# Patient Record
Sex: MALE | Race: WHITE | NOT HISPANIC OR LATINO
[De-identification: names, ages, dates, MRNs, and addresses within clinical notes are randomized per-mention and may not be internally consistent; named-entity substitution may affect disease eponyms.]

---

## 2017-01-17 PROBLEM — Z00.00 ENCOUNTER FOR PREVENTIVE HEALTH EXAMINATION: Status: ACTIVE | Noted: 2017-01-17

## 2017-01-19 ENCOUNTER — APPOINTMENT (OUTPATIENT)
Dept: PULMONOLOGY | Facility: CLINIC | Age: 67
End: 2017-01-19

## 2017-01-19 VITALS
DIASTOLIC BLOOD PRESSURE: 80 MMHG | OXYGEN SATURATION: 97 % | BODY MASS INDEX: 28.06 KG/M2 | RESPIRATION RATE: 17 BRPM | WEIGHT: 196 LBS | HEIGHT: 70 IN | HEART RATE: 71 BPM | SYSTOLIC BLOOD PRESSURE: 140 MMHG

## 2017-01-19 DIAGNOSIS — Z87.898 PERSONAL HISTORY OF OTHER SPECIFIED CONDITIONS: ICD-10-CM

## 2017-01-19 DIAGNOSIS — Z82.69 FAMILY HISTORY OF OTHER DISEASES OF THE MUSCULOSKELETAL SYSTEM AND CONNECTIVE TISSUE: ICD-10-CM

## 2017-01-19 DIAGNOSIS — Z78.9 OTHER SPECIFIED HEALTH STATUS: ICD-10-CM

## 2017-01-19 DIAGNOSIS — Z80.42 FAMILY HISTORY OF MALIGNANT NEOPLASM OF PROSTATE: ICD-10-CM

## 2017-01-19 DIAGNOSIS — R07.89 OTHER CHEST PAIN: ICD-10-CM

## 2017-01-19 DIAGNOSIS — R00.2 PALPITATIONS: ICD-10-CM

## 2017-01-19 DIAGNOSIS — Z83.3 FAMILY HISTORY OF DIABETES MELLITUS: ICD-10-CM

## 2017-01-19 DIAGNOSIS — Z82.49 FAMILY HISTORY OF ISCHEMIC HEART DISEASE AND OTHER DISEASES OF THE CIRCULATORY SYSTEM: ICD-10-CM

## 2017-01-19 DIAGNOSIS — Z86.79 PERSONAL HISTORY OF OTHER DISEASES OF THE CIRCULATORY SYSTEM: ICD-10-CM

## 2017-01-19 RX ORDER — LEVALBUTEROL TARTRATE 45 UG/1
45 AEROSOL, METERED ORAL EVERY 6 HOURS
Qty: 1 | Refills: 1 | Status: ACTIVE | COMMUNITY
Start: 2017-01-19 | End: 1900-01-01

## 2017-02-28 PROBLEM — Z82.69 FAMILY HISTORY OF SYSTEMIC LUPUS ERYTHEMATOSUS: Status: ACTIVE | Noted: 2017-01-19

## 2017-02-28 PROBLEM — Z87.898 HISTORY OF MARIJUANA USE: Status: ACTIVE | Noted: 2017-01-19

## 2017-02-28 PROBLEM — R00.2 PALPITATIONS: Status: ACTIVE | Noted: 2017-01-19

## 2017-02-28 PROBLEM — Z86.79 HISTORY OF HYPERTENSION: Status: RESOLVED | Noted: 2017-02-28 | Resolved: 2017-02-28

## 2017-02-28 PROBLEM — Z82.49 FAMILY HISTORY OF CARDIAC DISORDER: Status: ACTIVE | Noted: 2017-01-19

## 2017-02-28 PROBLEM — Z78.9 SOCIAL ALCOHOL USE: Status: ACTIVE | Noted: 2017-01-19

## 2017-03-03 ENCOUNTER — APPOINTMENT (OUTPATIENT)
Dept: PULMONOLOGY | Facility: CLINIC | Age: 67
End: 2017-03-03

## 2017-03-08 ENCOUNTER — APPOINTMENT (OUTPATIENT)
Dept: PULMONOLOGY | Facility: CLINIC | Age: 67
End: 2017-03-08

## 2017-03-16 ENCOUNTER — RESULT REVIEW (OUTPATIENT)
Age: 67
End: 2017-03-16

## 2017-04-26 ENCOUNTER — APPOINTMENT (OUTPATIENT)
Dept: PULMONOLOGY | Facility: CLINIC | Age: 67
End: 2017-04-26

## 2017-04-26 VITALS
RESPIRATION RATE: 17 BRPM | OXYGEN SATURATION: 96 % | HEIGHT: 70 IN | DIASTOLIC BLOOD PRESSURE: 90 MMHG | SYSTOLIC BLOOD PRESSURE: 136 MMHG | HEART RATE: 74 BPM | WEIGHT: 196 LBS | BODY MASS INDEX: 28.06 KG/M2

## 2017-04-26 DIAGNOSIS — J45.20 MILD INTERMITTENT ASTHMA, UNCOMPLICATED: ICD-10-CM

## 2017-04-26 DIAGNOSIS — E66.3 OVERWEIGHT: ICD-10-CM

## 2017-04-26 DIAGNOSIS — R06.02 SHORTNESS OF BREATH: ICD-10-CM

## 2017-04-26 DIAGNOSIS — K21.9 GASTRO-ESOPHAGEAL REFLUX DISEASE W/OUT ESOPHAGITIS: ICD-10-CM

## 2017-04-26 DIAGNOSIS — J30.89 OTHER ALLERGIC RHINITIS: ICD-10-CM

## 2017-04-26 RX ORDER — FLUTICASONE PROPIONATE 50 UG/1
50 SPRAY, METERED NASAL
Qty: 16 | Refills: 0 | Status: ACTIVE | COMMUNITY
Start: 2016-07-11

## 2017-04-26 RX ORDER — MONTELUKAST 10 MG/1
10 TABLET, FILM COATED ORAL
Qty: 90 | Refills: 0 | Status: ACTIVE | COMMUNITY
Start: 2016-10-07

## 2017-04-26 RX ORDER — ALPRAZOLAM 0.25 MG/1
0.25 TABLET ORAL
Qty: 30 | Refills: 0 | Status: ACTIVE | COMMUNITY
Start: 2017-01-04

## 2017-04-26 RX ORDER — METRONIDAZOLE 500 MG/1
500 TABLET ORAL
Qty: 30 | Refills: 0 | Status: DISCONTINUED | COMMUNITY
Start: 2017-01-31 | End: 2017-04-26

## 2017-04-26 RX ORDER — CIPROFLOXACIN HYDROCHLORIDE 500 MG/1
500 TABLET, FILM COATED ORAL
Qty: 20 | Refills: 0 | Status: DISCONTINUED | COMMUNITY
Start: 2017-01-31 | End: 2017-04-26

## 2017-04-26 RX ORDER — METOPROLOL SUCCINATE 25 MG/1
25 TABLET, EXTENDED RELEASE ORAL
Qty: 30 | Refills: 0 | Status: ACTIVE | COMMUNITY
Start: 2016-02-11

## 2017-04-26 RX ORDER — AMOXICILLIN AND CLAVULANATE POTASSIUM 875; 125 MG/1; MG/1
875-125 TABLET, COATED ORAL
Qty: 20 | Refills: 0 | Status: DISCONTINUED | COMMUNITY
Start: 2017-01-04 | End: 2017-04-26

## 2017-04-26 RX ORDER — LEVALBUTEROL TARTRATE 45 UG/1
45 AEROSOL, METERED ORAL
Qty: 3 | Refills: 2 | Status: ACTIVE | COMMUNITY
Start: 2017-04-26 | End: 1900-01-01

## 2017-07-24 ENCOUNTER — RX RENEWAL (OUTPATIENT)
Age: 67
End: 2017-07-24

## 2017-08-28 ENCOUNTER — APPOINTMENT (OUTPATIENT)
Dept: PULMONOLOGY | Facility: CLINIC | Age: 67
End: 2017-08-28

## 2017-10-18 ENCOUNTER — APPOINTMENT (OUTPATIENT)
Dept: DERMATOLOGY | Facility: CLINIC | Age: 67
End: 2017-10-18
Payer: MEDICARE

## 2017-10-18 DIAGNOSIS — Z85.46 PERSONAL HISTORY OF MALIGNANT NEOPLASM OF PROSTATE: ICD-10-CM

## 2017-10-18 DIAGNOSIS — R58 HEMORRHAGE, NOT ELSEWHERE CLASSIFIED: ICD-10-CM

## 2017-10-18 DIAGNOSIS — B07.9 VIRAL WART, UNSPECIFIED: ICD-10-CM

## 2017-10-18 DIAGNOSIS — D17.9 BENIGN LIPOMATOUS NEOPLASM, UNSPECIFIED: ICD-10-CM

## 2017-10-18 PROCEDURE — 17110 DESTRUCTION B9 LES UP TO 14: CPT

## 2017-10-18 PROCEDURE — 99213 OFFICE O/P EST LOW 20 MIN: CPT | Mod: 25

## 2017-10-18 RX ORDER — LEVOCETIRIZINE DIHYDROCHLORIDE 5 MG/1
5 TABLET ORAL
Qty: 1 | Refills: 1 | Status: DISCONTINUED | COMMUNITY
Start: 2017-01-19 | End: 2017-10-18

## 2017-10-18 RX ORDER — AMLODIPINE BESYLATE 2.5 MG/1
2.5 TABLET ORAL
Qty: 30 | Refills: 0 | Status: DISCONTINUED | COMMUNITY
Start: 2016-08-17 | End: 2017-10-18

## 2017-10-18 RX ORDER — ATORVASTATIN CALCIUM 20 MG/1
20 TABLET, FILM COATED ORAL
Qty: 90 | Refills: 0 | Status: DISCONTINUED | COMMUNITY
Start: 2016-11-11 | End: 2017-10-18

## 2017-10-18 RX ORDER — ATORVASTATIN CALCIUM 20 MG/1
20 TABLET, FILM COATED ORAL
Refills: 0 | Status: ACTIVE | COMMUNITY

## 2017-11-06 ENCOUNTER — RX RENEWAL (OUTPATIENT)
Age: 67
End: 2017-11-06

## 2017-11-26 ENCOUNTER — EMERGENCY (EMERGENCY)
Facility: HOSPITAL | Age: 67
LOS: 1 days | Discharge: HOME | End: 2017-11-26
Attending: EMERGENCY MEDICINE | Admitting: EMERGENCY MEDICINE
Payer: MEDICARE

## 2017-11-26 VITALS
SYSTOLIC BLOOD PRESSURE: 157 MMHG | WEIGHT: 190.04 LBS | RESPIRATION RATE: 20 BRPM | DIASTOLIC BLOOD PRESSURE: 96 MMHG | OXYGEN SATURATION: 100 % | TEMPERATURE: 98 F | HEIGHT: 69 IN | HEART RATE: 84 BPM

## 2017-11-26 VITALS
RESPIRATION RATE: 18 BRPM | DIASTOLIC BLOOD PRESSURE: 75 MMHG | HEART RATE: 71 BPM | SYSTOLIC BLOOD PRESSURE: 126 MMHG | OXYGEN SATURATION: 99 %

## 2017-11-26 LAB
HCT VFR BLD CALC: 38.9 % — LOW (ref 42–52)
HGB BLD-MCNC: 13.4 G/DL — LOW (ref 14–18)
INR BLD: 0.99 RATIO — SIGNIFICANT CHANGE UP (ref 0.88–1.16)
MCHC RBC-ENTMCNC: 32.5 PG — HIGH (ref 27–31)
MCHC RBC-ENTMCNC: 34.4 G/DL — SIGNIFICANT CHANGE UP (ref 32–36)
MCV RBC AUTO: 94.4 FL — HIGH (ref 80–94)
PLATELET # BLD AUTO: 200 K/UL — SIGNIFICANT CHANGE UP (ref 150–400)
PROTHROM AB SERPL-ACNC: 10.9 SEC — SIGNIFICANT CHANGE UP (ref 9.8–12.7)
RBC # BLD: 4.12 M/UL — LOW (ref 4.6–6.2)
RBC # FLD: 13.6 % — SIGNIFICANT CHANGE UP (ref 11–15.6)
WBC # BLD: 8.4 K/UL — SIGNIFICANT CHANGE UP (ref 4.8–10.8)
WBC # FLD AUTO: 8.4 K/UL — SIGNIFICANT CHANGE UP (ref 4.8–10.8)

## 2017-11-26 PROCEDURE — 36415 COLL VENOUS BLD VENIPUNCTURE: CPT

## 2017-11-26 PROCEDURE — 99284 EMERGENCY DEPT VISIT MOD MDM: CPT | Mod: 25

## 2017-11-26 PROCEDURE — 85027 COMPLETE CBC AUTOMATED: CPT

## 2017-11-26 PROCEDURE — 12002 RPR S/N/AX/GEN/TRNK2.6-7.5CM: CPT

## 2017-11-26 PROCEDURE — 85610 PROTHROMBIN TIME: CPT

## 2017-11-26 PROCEDURE — 70450 CT HEAD/BRAIN W/O DYE: CPT

## 2017-11-26 PROCEDURE — 93010 ELECTROCARDIOGRAM REPORT: CPT

## 2017-11-26 PROCEDURE — 93005 ELECTROCARDIOGRAM TRACING: CPT

## 2017-11-26 PROCEDURE — 71045 X-RAY EXAM CHEST 1 VIEW: CPT

## 2017-11-26 PROCEDURE — 70450 CT HEAD/BRAIN W/O DYE: CPT | Mod: 26

## 2017-11-26 PROCEDURE — 71010: CPT | Mod: 26

## 2017-11-26 RX ORDER — IBUPROFEN 200 MG
1 TABLET ORAL
Qty: 28 | Refills: 0 | OUTPATIENT
Start: 2017-11-26 | End: 2017-12-03

## 2017-11-26 RX ORDER — IBUPROFEN 200 MG
1 TABLET ORAL
Qty: 0 | Refills: 0 | COMMUNITY

## 2017-11-26 RX ORDER — IBUPROFEN 200 MG
1 TABLET ORAL
Qty: 20 | Refills: 0 | OUTPATIENT
Start: 2017-11-26 | End: 2017-12-01

## 2017-11-26 RX ORDER — ACETAMINOPHEN 500 MG
650 TABLET ORAL ONCE
Qty: 0 | Refills: 0 | Status: COMPLETED | OUTPATIENT
Start: 2017-11-26 | End: 2017-11-26

## 2017-11-26 RX ORDER — CEPHALEXIN 500 MG
1 CAPSULE ORAL
Qty: 10 | Refills: 0 | OUTPATIENT
Start: 2017-11-26 | End: 2017-12-01

## 2017-11-26 RX ORDER — CEPHALEXIN 500 MG
1 CAPSULE ORAL
Qty: 0 | Refills: 0 | COMMUNITY

## 2017-11-26 RX ADMIN — Medication 650 MILLIGRAM(S): at 20:41

## 2017-11-26 NOTE — ED STATDOCS - PLAN OF CARE
Stable  Lac repaired with staples and sutures  Pt to return to ED or urgent care in 7-10 for suture/staple removal  Pt to follow up with PMD in 48 hours  Pt to be discharged home on keflex for 5 days  Pt to take ibuprofen or tylenol PRN as pain

## 2017-11-26 NOTE — ED ADULT NURSE REASSESSMENT NOTE - NS ED NURSE REASSESS COMMENT FT1
pt was seen by Trauma resident pt resting comfortably small amount of bleeding to staple line. pt denies pain pt denies dizziness.

## 2017-11-26 NOTE — CONSULT NOTE ADULT - SUBJECTIVE AND OBJECTIVE BOX
68 y/o M presented to ED s/p fall with bleeding from scalp, as per the pt he states that he slipped and fell about 2 flight of stairs and hit his head on the door. pt had a cut and started bleeding after the incident. he denies LOC, nausea, vomiting.  He states that he tripped and fell and denies any dizziness, blurring of vision or aura before the fall  in the trauma bay the scalp laceration was already repaired by ED  pt conscious and moving all extremities  PMH: HTN  PSH: ex lap for SBO  Allergies: none    MEDICATIONS  (STANDING):    MEDICATIONS  (PRN):      Vital Signs Last 24 Hrs  T(C): 36.4 (26 Nov 2017 16:09), Max: 36.4 (26 Nov 2017 16:09)  T(F): 97.5 (26 Nov 2017 16:09), Max: 97.5 (26 Nov 2017 16:09)  HR: 71 (26 Nov 2017 18:49) (67 - 84)  BP: 126/75 (26 Nov 2017 18:49) (126/75 - 157/96)  BP(mean): --  RR: 18 (26 Nov 2017 18:49) (18 - 20)  SpO2: 99% (26 Nov 2017 18:49) (95% - 100%)    Primary Survey  A: intact  B: CTAB  C: b/l femoral artery 2+  D: GCS15, pupils 3 mm RRR  E: head to toe exposed, 10 cm laceration on the scalp, repaired with staples and sutures by ED    Secondary survey  pt alert, Ox3  chest: CTAB  CVS: S1S2  abd: soft, non tender, no guarding and rigidity  CNS: intact  cSpine: stable  Chest: stable  Pelvis: stable    I&O's Detail      LABS:                        13.4   8.4   )-----------( 200      ( 26 Nov 2017 18:14 )             38.9           PT/INR - ( 26 Nov 2017 18:14 )   PT: 10.9 sec;   INR: 0.99 ratio               RADIOLOGY & ADDITIONAL STUDIES:

## 2017-11-26 NOTE — ED STATDOCS - PROGRESS NOTE DETAILS
Pt is a 67 y.o M s/p fall at home, present with 7cm scalp lac. 12 staples applied to scalp to control bleeding along with 3 prolene sutures and lido with epi.  Wound was irrigated and cleaned and bleeding controlled. Pt started to feel a little dizzy. Pt was put supine and IV was started with bolus of Normal saline. Pt improved after fluids. Set of vitals were taken at bed side, VSS with /80. PE: Cardio: normal s1, s2. Resp: CTA b/l. Neuro: ANO x3, no focal deficits. Skin: 6-7cm right scalp lac. A/P: Pt to go for CT head. Trauma consult pending. CT head negative for intracranial bleeding. Pt seen by trauma who was cleared from their standpoint. Lac was reexplored after bleeding was controlled. In total, 18 staples and 12 prolene 4.0 Sutures. Pt to be discharged home on keflex for 5 days. Follow up with PMD in 48 hours and sutures/staples to be removed in 7-10 days.

## 2017-11-26 NOTE — ED STATDOCS - MEDICAL DECISION MAKING DETAILS
Will CT head, laceration repair, and reevaluate. CT head negative. Cleared by trauma. Lac repaired. Pt to follow up in 7-10 days for suture/staple removal.

## 2017-11-26 NOTE — ED STATDOCS - ATTENDING CONTRIBUTION TO CARE
I, Guille Carey, performed the initial face to face bedside interview with this patient regarding history of present illness, review of symptoms and relevant past medical, social and family history.  I completed an independent physical examination.  I was the initial provider who evaluated this patient. I have signed out the follow up of any pending tests (i.e. labs, radiological studies) to the ACP.  I have communicated the patient’s plan of care and disposition with the ACP.

## 2017-11-26 NOTE — ED STATDOCS - CARE PLAN
Principal Discharge DX:	Scalp laceration  Instructions for follow-up, activity and diet:	Stable  Lac repaired with staples and sutures  Pt to return to ED or urgent care in 7-10 for suture/staple removal  Pt to follow up with PMD in 48 hours  Pt to be discharged home on keflex for 5 days  Pt to take ibuprofen or tylenol PRN as pain Principal Discharge DX:	Scalp laceration  Instructions for follow-up, activity and diet:	Stable  Lac repaired with staples and sutures  Pt to return to ED or urgent care in 7-10 for suture/staple removal  Pt to follow up with PMD in 48 hours  Pt to be discharged home on keflex for 5 days  Pt to take ibuprofen or tylenol PRN as pain  Secondary Diagnosis:	Vasovagal episode

## 2017-11-26 NOTE — ED ADULT TRIAGE NOTE - CHIEF COMPLAINT QUOTE
top of head lac and hematoma, tripped while carrying fire wood. head into door frame, no LOC, no blood thinners, just baby asa.

## 2017-11-26 NOTE — CONSULT NOTE ADULT - ATTENDING COMMENTS
trauma workup - no traumatic findings other then scalp laceration that was repaired by the ER staff. cleared for d/c to home, f/u with ACS in two weeks .

## 2017-11-26 NOTE — ED ADULT NURSE NOTE - OBJECTIVE STATEMENT
Assumed patient care at 1620.  Awake alert and oriented.  reports falling and hitting head on door today.  presents with actively bleeding laceration to right parietal region.  denies any other associated symptoms.  Denies any blood thinners, loc.

## 2017-11-26 NOTE — ED ADULT NURSE REASSESSMENT NOTE - NS ED NURSE REASSESS COMMENT FT1
called to bedside by Dr Carey pt pale and dizzy. pt had a head laceration being sutured by MIGEL Ragland pt had excessive blood loss. #18j iv started fluids up and infusing well. will monitor

## 2017-11-26 NOTE — CONSULT NOTE ADULT - ASSESSMENT
66 y/o M with s/p fall with scalp laceration on 81 mg aspirin  - laceration repaired in ED  - CT head and cxr negative  - pt can be discharged from trauma  - wound care instructions as per ED recs

## 2017-11-26 NOTE — ED ADULT NURSE REASSESSMENT NOTE - NS ED NURSE REASSESS COMMENT FT1
pt states that he feels a lot better pt has color back in his face and lips. pt denies pain at this time.

## 2017-11-26 NOTE — ED STATDOCS - OBJECTIVE STATEMENT
66 y/o M pt presents to ED c/o laceration with active bleeding to head and headache s/p fall. Pt reports he tripped and hit his head against the corner of a door frame. He states "I feel a little off." Unknown last tetanus. He is currently on ASA 81 mg, statins, and "low dose beta blocker." Pt denies LOC, CP, SOB, nausea, vomiting, visual changes, and disorientation. No further complaints at this time.

## 2017-11-29 ENCOUNTER — EMERGENCY (EMERGENCY)
Facility: HOSPITAL | Age: 67
LOS: 1 days | Discharge: HOME | End: 2017-11-29
Attending: EMERGENCY MEDICINE
Payer: MEDICARE

## 2017-11-29 VITALS
HEIGHT: 69 IN | WEIGHT: 179.9 LBS | RESPIRATION RATE: 16 BRPM | TEMPERATURE: 98 F | OXYGEN SATURATION: 98 % | HEART RATE: 71 BPM | DIASTOLIC BLOOD PRESSURE: 85 MMHG | SYSTOLIC BLOOD PRESSURE: 144 MMHG

## 2017-11-29 PROCEDURE — 99283 EMERGENCY DEPT VISIT LOW MDM: CPT

## 2017-11-29 PROCEDURE — 99282 EMERGENCY DEPT VISIT SF MDM: CPT

## 2017-11-29 NOTE — ED STATDOCS - SKIN, MLM
8 cm wound noted to the frontal parietal region of the scalp that include staples and sutures. Wound is clean and dry, no hematoma noted, going to the face right paraorbital ecchymosis ,

## 2017-11-29 NOTE — ED STATDOCS - OBJECTIVE STATEMENT
This is a 66 y/o M pt presents to ED with 18 staples and sutures on his right side of his face. Additionally the pt states that the right side of his face is starting to swell and bruise. Pt states he fell three days ago and had a Ct scan performed. He states he went to [facility] today and they refused to see him. Pt denies increasing pain. Denies HA, N/V/D, fever, chills, SOB, CP and abd pain. No further complaints at this time.

## 2017-11-29 NOTE — ED ADULT NURSE NOTE - OBJECTIVE STATEMENT
Pt had staples and sutures placed on the top of scalp on Sunday. Pt had questions today concerning his wound care and had some ecchymosis to right eye.

## 2019-05-08 ENCOUNTER — TRANSCRIPTION ENCOUNTER (OUTPATIENT)
Age: 69
End: 2019-05-08

## 2019-11-08 PROBLEM — I10 ESSENTIAL (PRIMARY) HYPERTENSION: Chronic | Status: ACTIVE | Noted: 2017-11-26

## 2019-11-14 ENCOUNTER — APPOINTMENT (OUTPATIENT)
Dept: DERMATOLOGY | Facility: CLINIC | Age: 69
End: 2019-11-14
Payer: MEDICARE

## 2019-11-14 PROCEDURE — 99213 OFFICE O/P EST LOW 20 MIN: CPT

## 2019-11-14 RX ORDER — LEVOCETIRIZINE DIHYDROCHLORIDE 5 MG/1
5 TABLET ORAL
Qty: 90 | Refills: 1 | Status: DISCONTINUED | COMMUNITY
Start: 2017-04-26 | End: 2019-11-14

## 2019-11-14 NOTE — HISTORY OF PRESENT ILLNESS
[de-identified] : Pt. presents for skin check;\par c/o discoloration on back;  no txs, noted by acquaintance\par Severity:  mild  \par Modifying factors:  none\par Associated symptoms:  none\par Context:  no association with activity \par \par

## 2019-11-14 NOTE — PHYSICAL EXAM
[Full Body Skin Exam Performed] : performed [FreeTextEntry3] : Skin examination performed of the face, neck, trunk, arms, legs; \par The patient is well, alert and oriented, pleasant and cooperative.\par Eyelids, conjunctivae, oral mucosa, digits and nails all normal.  \par No cervical adenopathy.\par \par Normal findings include:\par \par Seborrheic keratoses\par Angiomas\par + lentigines and solar damage are present in sun exposed areas; \par back;  widespread, irregular hyperpigmentation with sharp demarcation lines\par \par No lesions were suspicious for malignancy. \par \par

## 2019-11-14 NOTE — ASSESSMENT
[FreeTextEntry1] : Complete skin examination is negative for malignancy;\par The patient has a history of significant sun exposure.  Continue annual exams. \par Back discoloration likely photo induced;  2' irregular application of sunblock on back;  Should gradually fade;  no suspicious features

## 2019-12-02 ENCOUNTER — EMERGENCY (EMERGENCY)
Facility: HOSPITAL | Age: 69
LOS: 0 days | Discharge: HOME | End: 2019-12-02
Attending: EMERGENCY MEDICINE
Payer: MEDICARE

## 2019-12-02 VITALS — DIASTOLIC BLOOD PRESSURE: 98 MMHG | HEART RATE: 66 BPM | TEMPERATURE: 98 F | SYSTOLIC BLOOD PRESSURE: 150 MMHG

## 2019-12-02 VITALS — WEIGHT: 169.98 LBS | HEIGHT: 70 IN

## 2019-12-02 DIAGNOSIS — H53.8 OTHER VISUAL DISTURBANCES: ICD-10-CM

## 2019-12-02 DIAGNOSIS — R42 DIZZINESS AND GIDDINESS: ICD-10-CM

## 2019-12-02 DIAGNOSIS — Z85.46 PERSONAL HISTORY OF MALIGNANT NEOPLASM OF PROSTATE: ICD-10-CM

## 2019-12-02 DIAGNOSIS — I10 ESSENTIAL (PRIMARY) HYPERTENSION: ICD-10-CM

## 2019-12-02 DIAGNOSIS — G62.9 POLYNEUROPATHY, UNSPECIFIED: ICD-10-CM

## 2019-12-02 DIAGNOSIS — R20.0 ANESTHESIA OF SKIN: ICD-10-CM

## 2019-12-02 LAB
ALBUMIN SERPL ELPH-MCNC: 4 G/DL — SIGNIFICANT CHANGE UP (ref 3.3–5)
ALP SERPL-CCNC: 50 U/L — SIGNIFICANT CHANGE UP (ref 40–120)
ALT FLD-CCNC: 24 U/L — SIGNIFICANT CHANGE UP (ref 12–78)
ANION GAP SERPL CALC-SCNC: 5 MMOL/L — SIGNIFICANT CHANGE UP (ref 5–17)
APTT BLD: 33.6 SEC — SIGNIFICANT CHANGE UP (ref 27.5–36.3)
AST SERPL-CCNC: 14 U/L — LOW (ref 15–37)
BASOPHILS # BLD AUTO: 0.02 K/UL — SIGNIFICANT CHANGE UP (ref 0–0.2)
BASOPHILS NFR BLD AUTO: 0.3 % — SIGNIFICANT CHANGE UP (ref 0–2)
BILIRUB SERPL-MCNC: 0.8 MG/DL — SIGNIFICANT CHANGE UP (ref 0.2–1.2)
BUN SERPL-MCNC: 12 MG/DL — SIGNIFICANT CHANGE UP (ref 7–23)
CALCIUM SERPL-MCNC: 9.3 MG/DL — SIGNIFICANT CHANGE UP (ref 8.5–10.1)
CHLORIDE SERPL-SCNC: 108 MMOL/L — SIGNIFICANT CHANGE UP (ref 96–108)
CK SERPL-CCNC: 97 U/L — SIGNIFICANT CHANGE UP (ref 26–308)
CO2 SERPL-SCNC: 29 MMOL/L — SIGNIFICANT CHANGE UP (ref 22–31)
CREAT SERPL-MCNC: 0.95 MG/DL — SIGNIFICANT CHANGE UP (ref 0.5–1.3)
EOSINOPHIL # BLD AUTO: 0.02 K/UL — SIGNIFICANT CHANGE UP (ref 0–0.5)
EOSINOPHIL NFR BLD AUTO: 0.3 % — SIGNIFICANT CHANGE UP (ref 0–6)
GLUCOSE SERPL-MCNC: 92 MG/DL — SIGNIFICANT CHANGE UP (ref 70–99)
HCT VFR BLD CALC: 45.5 % — SIGNIFICANT CHANGE UP (ref 39–50)
HGB BLD-MCNC: 15.5 G/DL — SIGNIFICANT CHANGE UP (ref 13–17)
IMM GRANULOCYTES NFR BLD AUTO: 0.2 % — SIGNIFICANT CHANGE UP (ref 0–1.5)
INR BLD: 0.97 RATIO — SIGNIFICANT CHANGE UP (ref 0.88–1.16)
LYMPHOCYTES # BLD AUTO: 0.92 K/UL — LOW (ref 1–3.3)
LYMPHOCYTES # BLD AUTO: 14.6 % — SIGNIFICANT CHANGE UP (ref 13–44)
MCHC RBC-ENTMCNC: 32.9 PG — SIGNIFICANT CHANGE UP (ref 27–34)
MCHC RBC-ENTMCNC: 34.1 GM/DL — SIGNIFICANT CHANGE UP (ref 32–36)
MCV RBC AUTO: 96.6 FL — SIGNIFICANT CHANGE UP (ref 80–100)
MONOCYTES # BLD AUTO: 0.48 K/UL — SIGNIFICANT CHANGE UP (ref 0–0.9)
MONOCYTES NFR BLD AUTO: 7.6 % — SIGNIFICANT CHANGE UP (ref 2–14)
NEUTROPHILS # BLD AUTO: 4.87 K/UL — SIGNIFICANT CHANGE UP (ref 1.8–7.4)
NEUTROPHILS NFR BLD AUTO: 77 % — SIGNIFICANT CHANGE UP (ref 43–77)
PLATELET # BLD AUTO: 216 K/UL — SIGNIFICANT CHANGE UP (ref 150–400)
POTASSIUM SERPL-MCNC: 4.3 MMOL/L — SIGNIFICANT CHANGE UP (ref 3.5–5.3)
POTASSIUM SERPL-SCNC: 4.3 MMOL/L — SIGNIFICANT CHANGE UP (ref 3.5–5.3)
PROT SERPL-MCNC: 7.2 GM/DL — SIGNIFICANT CHANGE UP (ref 6–8.3)
PROTHROM AB SERPL-ACNC: 10.8 SEC — SIGNIFICANT CHANGE UP (ref 10–12.9)
RBC # BLD: 4.71 M/UL — SIGNIFICANT CHANGE UP (ref 4.2–5.8)
RBC # FLD: 12.8 % — SIGNIFICANT CHANGE UP (ref 10.3–14.5)
SODIUM SERPL-SCNC: 142 MMOL/L — SIGNIFICANT CHANGE UP (ref 135–145)
TROPONIN I SERPL-MCNC: <0.015 NG/ML — SIGNIFICANT CHANGE UP (ref 0.01–0.04)
WBC # BLD: 6.32 K/UL — SIGNIFICANT CHANGE UP (ref 3.8–10.5)
WBC # FLD AUTO: 6.32 K/UL — SIGNIFICANT CHANGE UP (ref 3.8–10.5)

## 2019-12-02 PROCEDURE — 86663 EPSTEIN-BARR ANTIBODY: CPT

## 2019-12-02 PROCEDURE — 86901 BLOOD TYPING SEROLOGIC RH(D): CPT

## 2019-12-02 PROCEDURE — 82550 ASSAY OF CK (CPK): CPT

## 2019-12-02 PROCEDURE — 86618 LYME DISEASE ANTIBODY: CPT

## 2019-12-02 PROCEDURE — 82140 ASSAY OF AMMONIA: CPT

## 2019-12-02 PROCEDURE — 36415 COLL VENOUS BLD VENIPUNCTURE: CPT

## 2019-12-02 PROCEDURE — 71045 X-RAY EXAM CHEST 1 VIEW: CPT | Mod: 26

## 2019-12-02 PROCEDURE — 86900 BLOOD TYPING SEROLOGIC ABO: CPT

## 2019-12-02 PROCEDURE — 86665 EPSTEIN-BARR CAPSID VCA: CPT

## 2019-12-02 PROCEDURE — 71045 X-RAY EXAM CHEST 1 VIEW: CPT

## 2019-12-02 PROCEDURE — 85730 THROMBOPLASTIN TIME PARTIAL: CPT

## 2019-12-02 PROCEDURE — 99285 EMERGENCY DEPT VISIT HI MDM: CPT

## 2019-12-02 PROCEDURE — 93005 ELECTROCARDIOGRAM TRACING: CPT

## 2019-12-02 PROCEDURE — 93010 ELECTROCARDIOGRAM REPORT: CPT

## 2019-12-02 PROCEDURE — 85610 PROTHROMBIN TIME: CPT

## 2019-12-02 PROCEDURE — 80053 COMPREHEN METABOLIC PANEL: CPT

## 2019-12-02 PROCEDURE — 86850 RBC ANTIBODY SCREEN: CPT

## 2019-12-02 PROCEDURE — 70498 CT ANGIOGRAPHY NECK: CPT

## 2019-12-02 PROCEDURE — 99284 EMERGENCY DEPT VISIT MOD MDM: CPT | Mod: 25

## 2019-12-02 PROCEDURE — 84484 ASSAY OF TROPONIN QUANT: CPT

## 2019-12-02 PROCEDURE — 70496 CT ANGIOGRAPHY HEAD: CPT

## 2019-12-02 PROCEDURE — 70498 CT ANGIOGRAPHY NECK: CPT | Mod: 26

## 2019-12-02 PROCEDURE — 82962 GLUCOSE BLOOD TEST: CPT

## 2019-12-02 PROCEDURE — 86664 EPSTEIN-BARR NUCLEAR ANTIGEN: CPT

## 2019-12-02 PROCEDURE — 85025 COMPLETE CBC W/AUTO DIFF WBC: CPT

## 2019-12-02 PROCEDURE — 70496 CT ANGIOGRAPHY HEAD: CPT | Mod: 26

## 2019-12-02 RX ORDER — SODIUM CHLORIDE 9 MG/ML
1000 INJECTION, SOLUTION INTRAVENOUS
Refills: 0 | Status: DISCONTINUED | OUTPATIENT
Start: 2019-12-02 | End: 2019-12-02

## 2019-12-02 RX ADMIN — SODIUM CHLORIDE 150 MILLILITER(S): 9 INJECTION, SOLUTION INTRAVENOUS at 11:44

## 2019-12-02 NOTE — ED STATDOCS - NS_ ATTENDINGSCRIBEDETAILS _ED_A_ED_FT
I, Victor Hugo Manrique MD,  performed the initial face to face bedside interview with this patient regarding history of present illness, review of symptoms and relevant past medical, social and family history.  I completed an independent physical examination.  I was the initial provider who evaluated this patient.  The history, relevant review of systems, past medical and surgical history, medical decision making, and physical examination was documented by the scribe in my presence and I attest to the accuracy of the documentation.

## 2019-12-02 NOTE — ED STATDOCS - NSFOLLOWUPINSTRUCTIONS_ED_ALL_ED_FT
PLEASE FOLLOW UP WITH A PSYCHIATRIST AND NEUROLOGIST TO FURTHER EVALUATE YOUR SYMPTOMS.   RETURN TO THE ER FOR ANY NEW OR OTHER CONCERNS.

## 2019-12-02 NOTE — ED ADULT NURSE NOTE - OBJECTIVE STATEMENT
Pt c/o blurry vision since last night. Pt states x 1 month c/o BL hand and feet tingling and "I have an appointment with neuro in a few weeks".  PT states last night developed blurry vision which is a new symptom.  Pt admits to drinking 1 bottle of wine a night, last drank last night.  VSS, no tremors noted at this time.  Will continue to monitor.

## 2019-12-02 NOTE — ED ADULT NURSE NOTE - NSIMPLEMENTINTERV_GEN_ALL_ED
Implemented All Universal Safety Interventions:  [place] to call system. Call bell, personal items and telephone within reach. Instruct patient to call for assistance. Room bathroom lighting operational. Non-slip footwear when patient is off stretcher. Physically safe environment: no spills, clutter or unnecessary equipment. Stretcher in lowest position, wheels locked, appropriate side rails in place.

## 2019-12-02 NOTE — ED STATDOCS - PROGRESS NOTE DETAILS
Jose HUNTER for ED attending, Dr. Mnarique: Family member states pt drinks 1 bottle of wine a night since the passing of his wife 1 year ago. Potential that numbness is from peripheral neuropathy from folate B12 deficiency. 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 Pt noticed he recently has been feeling off balance and dizzy. Pt has been feeling anxious with these symptoms and was taking xanax without relief. Pt is a current day drinker and has cut down to 1-2 glasses a day. Denies cp, sob, suicidal ideations, fever. Will check labs, ct head/c spine, ekg, IVF, Reeval. -Keny Garcia PA-C Discussed results with pt and family at bedside. Labs and CTs are unremarkable. Advised to f/u with psych and neurologist. Pt states he has a f/u with Dr. Craig next week. Counseled on limiting and eventually stopping any alcohol use. Pt aware and strict return precautions given. Symptoms could be due to neuropathy or anxiety which the specialist can further evaluate. -Keny Garcia PA-C

## 2019-12-02 NOTE — ED STATDOCS - CLINICAL SUMMARY MEDICAL DECISION MAKING FREE TEXT BOX
69M hx of HTN, HLD, prostate cancer presents to the ED for numbness in bilateral feet present for the past month but worsening over past days with blurry vision and neck discomfort. Here, NIH 0. Likely peripheral neuropathy, Will obtain CT, EKG, labs, reassess.

## 2019-12-02 NOTE — ED STATDOCS - NS ED ROS FT
Constitutional: No fever or chills  Eyes: +visual changes  HEENT: No throat pain  CV: No chest pain  Resp: No SOB no cough  GI: No abd pain, nausea or vomiting  : No dysuria  MSK: +neck pain   Skin: No rash  Neuro: No headache +numbness in bilateral feet/hands

## 2019-12-02 NOTE — ED STATDOCS - PATIENT PORTAL LINK FT
You can access the FollowMyHealth Patient Portal offered by [facility] by registering at the following website: [email or web address]. By joining [employer]’s FollowMyHealth portal, you will also be able to view your health information using other applications (apps) compatible with our system.

## 2019-12-02 NOTE — ED STATDOCS - PHYSICAL EXAMINATION
Constitutional: mild distress AAOx3  Eyes: PERRLA EOMI  Head: Normocephalic atraumatic  Mouth: MMM  Cardiac: regular rate   Resp: Lungs CTAB  GI: Abd s/nt/nd  Neuro: CN2-12 intact  Skin: No rashes  Msk: no midline spinal TTP, normal peripheral pulses, normal strength/sensation/coordination/gait, NIH 0 Constitutional: mild distress AAOx3  Eyes: PERRLA EOMI  Head: Normocephalic atraumatic  Mouth: MMM  Cardiac: regular rate   Resp: Lungs CTAB  GI: Abd s/nt/nd  Neuro: CN2-12 intact normal strength/sensation/coordination/gait, NIH 0  Skin: No rashes  Msk: no midline spinal TTP, normal peripheral pulses,

## 2019-12-02 NOTE — ED ADULT TRIAGE NOTE - CHIEF COMPLAINT QUOTE
pt aaox4, pt presents to er c/o intermittent blurry vision started yesterday after while walking the dog, pt states "I watch lot of tv, the blurry vision gets better when wearing the reading glasses,"  pt also c/o hand, foot,  b/l numbness, tingling for 1 month.  LAMS score 0 - md parham made aware.  pt to be seen in intake.

## 2019-12-02 NOTE — ED STATDOCS - OBJECTIVE STATEMENT
68 y/o male with PMHx of HTN, prostate cancer (last treated 2.5 years ago) presents to the ED c/o numbness to bilateral feet/hands x1 month and blurred vision and feeling off balance and dizzy since last night. States he has been feeling the numbness to the hands and feet intermittently, more so when he his sitting down. Pt also reports he took a teaspoon of CBD oil at 9:30PM yesterday and around 11PM he started to have visual changes, feeling like the room was floating. Then started to feel some neck pain and became anxious. Took half of a Xanax before bed and another half at 3AM today. Denies fever, slurred speech, chest pain, SOB, abdominal pain. Pt has f/u appointment with Dr. Crain. States he has been under a lot of stress recently, as it was just the first anniversary of his wife's death. +EtOH use, has been cutting down on how much he drinks per night and states he has been craving EtOH more lately. Neuro: Breann. GI: Daniel.

## 2019-12-03 ENCOUNTER — APPOINTMENT (OUTPATIENT)
Dept: NEUROLOGY | Facility: CLINIC | Age: 69
End: 2019-12-03
Payer: MEDICARE

## 2019-12-03 VITALS
HEIGHT: 70 IN | DIASTOLIC BLOOD PRESSURE: 82 MMHG | BODY MASS INDEX: 24.34 KG/M2 | SYSTOLIC BLOOD PRESSURE: 149 MMHG | HEART RATE: 80 BPM | WEIGHT: 170 LBS

## 2019-12-03 DIAGNOSIS — H53.8 OTHER VISUAL DISTURBANCES: ICD-10-CM

## 2019-12-03 PROCEDURE — 99204 OFFICE O/P NEW MOD 45 MIN: CPT

## 2019-12-03 RX ORDER — OMEPRAZOLE 40 MG/1
40 CAPSULE, DELAYED RELEASE ORAL
Qty: 30 | Refills: 0 | Status: DISCONTINUED | COMMUNITY
Start: 2017-01-19 | End: 2019-12-03

## 2019-12-03 NOTE — ED POST DISCHARGE NOTE - REASON FOR FOLLOW-UP
Other +Chance Barr Virus Early antigen +IgG -IgM reported to patient and agreed to F/U with Dr. Ralph. Jesus NP

## 2019-12-03 NOTE — DISCUSSION/SUMMARY
[FreeTextEntry1] : Mr. Tracy is a 69 year old man with several weeks of bilateral upper and lower extremity paresthesias. In addition, he had an episode on 12/2/19 of sudden onset of head and neck pain with blurry vision. \par \par His neurological examination at this time is non-focal.\par \par -Paresthesias - Likely related to peripheral neuropathy, although no clear signs of neuropathy on examination.\par HbA1C is normal.\par Vitamin B12 is slightly low. B12 deficiency and increased use of alcohol may be contributing factors. \par Agree with vitamin B12 supplementation. He has received 2 injections with Dr. Espinoza and is now taking 1000 mcg/day. Repeat level in 2 months. \par Decreased ETOH use.\par Will get MRI brain and cervical spine to rule out demyelinating disease.\par If MRI is negative and symptoms persist, will do EMG to evaluate for neuropathy. If positive and no improvement in symptoms with B12 supplementation,will also check SPEP, Sjogren's antibodies, RF, vitamin B6.\par \par -Episode of blurry vision - The cause is unclear but symptoms have resolved. Possibly migraine phenomenon. Anxiety/panic also is a likely contributing factor. \par He is planning on seeing a psychiatrist. \par \par I will follow up with him after the above testing, sooner if needed.

## 2019-12-03 NOTE — DATA REVIEWED
[de-identified] : blood test 11/11/19: HbA1C 5.2, vitamin D 34.8, TSH 3.6, ESR 4\par blood tests 11/12/19: MERVIN negative, ESR 6, CRP less than 0.10, vitamin B12 273, foliate 16.9, ferritin 73\par \par CT head/CTA brain/CTA neck 12/2/19:\par No significant stenosis in left or right carotid system\par No significant vascular lesion in intracranial circulation\par Brain: no significant lesion\par \par blood tests 12/2/19: lyme negative, CK 97, EBV IgG positive but IgM negative

## 2019-12-03 NOTE — HISTORY OF PRESENT ILLNESS
[FreeTextEntry1] : Mr. Tracy is here today for neurological evaluation. He is accompanied by his sister.\par On Sunday he was sitting around all day watching television which is unusual for him. \par On Sunday night he started to feel very strange. He recalls thinking that he was having a neurologic event.\par He had stiffness in the back of his head and neck. His vision seemed blurry. \par His left eye has been bothering him prior to this with twitching. He thinks the blurred vision was in both eyes. There was no double vision. \par The symptoms came on gradually and as he became more anxious the symptoms became worse. \par He felt "floaty". \par He had no difficulty speaking. \par He did not have any focal weakness or numbness tingling. He had no facial droop. \par He took Xanax and went to bed. He woke up at 3 AM and his symptoms were still present. When he woke up in the morning he felt better but he decided to go to the hospital to have everything checked out. \par He had no prior similar symptoms and he had no prior history of migraines. \par \par CT head, CTA head and neck was performed without any acute findings.\par \par 25 years ago he was having numbness in his legs. He was going through a very stressful time. He had a neurological evaluation which included a lumbar puncture.\par He was diagnosed at that time with chronic fatigue.\par \par He has been going to the [employer] and exercising vigorously over the last 6 months. He recently started using the elliptical machine. He has noticed that when coming off the elliptical he has had some pins and needles in his feet. This has been occurring over the last six weeks. \par The symptoms are now more persistent. \par He has also noticed it in his hands. Occasionally he feels it in his face.\par Occasionally he has had some pain on the bottom of his feet. \par He does not have any weakness in his hands or feet.\par \par He denies having neck pain.\par His wife passed away one year ago from a brain tumor.\par \par In 2017 he was diagnosed with prostate cancer. \par He has undergone treatment and has some radiation proctitis.\par \par Over the last three years he started drinking up to 4 drinks per night.

## 2019-12-03 NOTE — CONSULT LETTER
[Dear  ___] : Dear  [unfilled], [Please see my note below.] : Please see my note below. [Consult Letter:] : I had the pleasure of evaluating your patient, [unfilled]. [FreeTextEntry3] : Sincerely,\par \par \par Monika Crain MD\par Diplomate, American Academy of Psychiatry and Neurology\par Board Certified in the Subspecialty of Clinical Neurophysiology\par Board Certified in the Subspecialty of Sleep Medicine\par Board Certified in the Subspecialty of Epilepsy\par  [FreeTextEntry2] : Karan Espinoza [Consult Closing:] : Thank you very much for allowing me to participate in the care of this patient.  If you have any questions, please do not hesitate to contact me.

## 2019-12-03 NOTE — REVIEW OF SYSTEMS
[Anxiety] : anxiety [As Noted in HPI] : as noted in HPI [Joint Pain] : joint pain [Eyesight Problems] : eyesight problems [Negative] : Genitourinary [de-identified] : vision changes [FreeTextEntry7] : blood in stool

## 2019-12-09 ENCOUNTER — OTHER (OUTPATIENT)
Age: 69
End: 2019-12-09

## 2020-01-14 ENCOUNTER — APPOINTMENT (OUTPATIENT)
Dept: NEUROLOGY | Facility: CLINIC | Age: 70
End: 2020-01-14
Payer: MEDICARE

## 2020-01-14 VITALS
HEART RATE: 74 BPM | HEIGHT: 70 IN | SYSTOLIC BLOOD PRESSURE: 140 MMHG | BODY MASS INDEX: 24.62 KG/M2 | WEIGHT: 172 LBS | DIASTOLIC BLOOD PRESSURE: 79 MMHG

## 2020-01-14 DIAGNOSIS — R20.2 PARESTHESIA OF SKIN: ICD-10-CM

## 2020-01-14 DIAGNOSIS — G62.9 POLYNEUROPATHY, UNSPECIFIED: ICD-10-CM

## 2020-01-14 PROCEDURE — 99213 OFFICE O/P EST LOW 20 MIN: CPT

## 2020-01-14 NOTE — DATA REVIEWED
[de-identified] : blood test 11/11/19: HbA1C 5.2, vitamin D 34.8, TSH 3.6, ESR 4\par blood tests 11/12/19: MERVIN negative, ESR 6, CRP less than 0.10, vitamin B12 273, foliate 16.9, ferritin 73\par \par CT head/CTA brain/CTA neck 12/2/19:\par No significant stenosis in left or right carotid system\par No significant vascular lesion in intracranial circulation\par Brain: no significant lesion\par \par blood tests 12/2/19: lyme negative, CK 97, EBV IgG positive but IgM negative\par \par MRI cervical spine 12/4/19:\par Straightening of cervical lordosis and multilevel degenerative cervical spondylosis.\par Small to moderate central disc herniation at C2-3.\par Spondylitic change at C3-4 with mild spinal stenosis, bilateral foraminal compromise and encroachment, if not impingement upon the C4 nerve roots bilaterally. There are similar findings at C4-5.\par Spondylitic change at C5-6 with minimal cord flattening and mild spinal stenosis as well as bilateral foraminal compromise and encroachment upon the C6 nerve roots bilaterally.\par Spondylitic change at C6/7 with mild to moderate bilateral foraminal compromise, greater on the left, with encroachment upon the C7 nerve roots bilaterally and minimal spinal stenosis.\par Spondylitic change C7-T1 with right greater than left foraminal narrowing and encroachment upon the right C8 nerve root. [de-identified] : MRI brain with and without contrast 12/4/19: Scattered nonspecific T2 signal abnormalities in the cerebral hemispheres bilaterally. These are not considered pathognomonic for demyelinating disease though findings should be correlated with patient's history and clinical presentation.

## 2020-01-14 NOTE — CONSULT LETTER
[Dear  ___] : Dear  [unfilled], [Consult Letter:] : I had the pleasure of evaluating your patient, [unfilled]. [Please see my note below.] : Please see my note below. [Consult Closing:] : Thank you very much for allowing me to participate in the care of this patient.  If you have any questions, please do not hesitate to contact me. [FreeTextEntry2] : Karan Espinoza [FreeTextEntry3] : Sincerely,\par \par \par Monika Crain MD\par Diplomate, American Academy of Psychiatry and Neurology\par Board Certified in the Subspecialty of Clinical Neurophysiology\par Board Certified in the Subspecialty of Sleep Medicine\par Board Certified in the Subspecialty of Epilepsy\par

## 2020-01-14 NOTE — DISCUSSION/SUMMARY
[FreeTextEntry1] : Mr. Tracy is a 69 year old man with several weeks of bilateral upper and lower extremity paresthesias. In addition, he had an episode on 12/2/19 of sudden onset of head and neck pain with blurry vision. \par \par His neurological examination at this time is non-focal.\par \par -Paresthesias - Likely related to peripheral neuropathy, although no clear signs of neuropathy on examination.\par HbA1C is normal.\par Vitamin B12 is being supplemented.\par Undergoing GI workup for absorption issues.\par Alcohol use is decreased.\par MRI cervical spine shows multilevel spondylitic changes but no cord pathology. \par EMG is scheduled for next month.  If positive and no improvement in symptoms with B12 supplementation,will also check SPEP, Sjogren's antibodies, RF, vitamin B6.\par \par \par -Episode of blurry vision - The cause is unclear but symptoms have resolved. Possibly migraine phenomenon. Anxiety/panic also is a likely contributing factor. \par MRi brain was unremarkable.\par He will follow up with opthalmology.\par \par -Continue follow-up with psychiatry for depression, anxiety. He may start an SSRI.\par He is in therapy.\par \par \par I will follow up with him after the above testing, sooner if needed. \par

## 2020-01-14 NOTE — HISTORY OF PRESENT ILLNESS
[FreeTextEntry1] : I last saw Mr. Tracy on 12/3/19.\par He says that he is waiting to see Dr. Sanchez tomorrow. He may have an absorption problem. Testing for Celiac was negative.\par He is seeing a psychiatric nurse practitioner regarding depression. He started Cymbalta but had some dizziness so he stopped. \par \par He is scheduled to have an EMG.\par He cancelled the initial one.\par he continues to have intermittent numbness/tingling in his legs.\par He has started to get vitamin B12 injections with Dr. Espinoza. He will be retested. \par \par He has reduced his alcohol intake to one drink per day.\par \par He has low back pain and discomfort.\par \par He has not had any more major episodes of blurry vision.The other day his vision did feel somewhat hazy .

## 2020-06-01 ENCOUNTER — APPOINTMENT (OUTPATIENT)
Dept: NEUROLOGY | Facility: CLINIC | Age: 70
End: 2020-06-01

## 2020-07-01 ENCOUNTER — APPOINTMENT (OUTPATIENT)
Dept: NEUROLOGY | Facility: CLINIC | Age: 70
End: 2020-07-01

## 2020-11-19 ENCOUNTER — APPOINTMENT (OUTPATIENT)
Dept: DERMATOLOGY | Facility: CLINIC | Age: 70
End: 2020-11-19

## 2020-12-10 ENCOUNTER — APPOINTMENT (OUTPATIENT)
Dept: DERMATOLOGY | Facility: CLINIC | Age: 70
End: 2020-12-10

## 2021-02-17 ENCOUNTER — APPOINTMENT (OUTPATIENT)
Dept: DERMATOLOGY | Facility: CLINIC | Age: 71
End: 2021-02-17

## 2023-06-23 ENCOUNTER — NON-APPOINTMENT (OUTPATIENT)
Age: 73
End: 2023-06-23

## 2023-06-23 ENCOUNTER — APPOINTMENT (OUTPATIENT)
Dept: DERMATOLOGY | Facility: CLINIC | Age: 73
End: 2023-06-23
Payer: MEDICARE

## 2023-06-23 DIAGNOSIS — L81.4 OTHER MELANIN HYPERPIGMENTATION: ICD-10-CM

## 2023-06-23 DIAGNOSIS — B36.0 PITYRIASIS VERSICOLOR: ICD-10-CM

## 2023-06-23 DIAGNOSIS — D22.9 MELANOCYTIC NEVI, UNSPECIFIED: ICD-10-CM

## 2023-06-23 DIAGNOSIS — L82.1 OTHER SEBORRHEIC KERATOSIS: ICD-10-CM

## 2023-06-23 PROCEDURE — 99204 OFFICE O/P NEW MOD 45 MIN: CPT

## 2023-06-23 RX ORDER — KETOCONAZOLE 20.5 MG/ML
2 SHAMPOO, SUSPENSION TOPICAL
Qty: 1 | Refills: 5 | Status: ACTIVE | COMMUNITY
Start: 2023-06-23 | End: 1900-01-01

## 2023-06-23 RX ORDER — AMLODIPINE BESYLATE 5 MG/1
TABLET ORAL
Refills: 0 | Status: ACTIVE | COMMUNITY

## 2023-06-23 RX ORDER — LIPASE/PROTEASE/AMYLASE 2.4-30-30K
CAPSULE ORAL
Refills: 0 | Status: ACTIVE | COMMUNITY

## 2023-06-23 RX ORDER — TAMSULOSIN HYDROCHLORIDE 0.4 MG/1
CAPSULE ORAL
Refills: 0 | Status: ACTIVE | COMMUNITY

## 2023-06-23 RX ORDER — ATORVASTATIN CALCIUM 20 MG/1
20 TABLET, FILM COATED ORAL
Refills: 0 | Status: ACTIVE | COMMUNITY

## 2023-06-23 NOTE — PHYSICAL EXAM
[Full Body Skin Exam Performed] : performed [FreeTextEntry3] : Skin examination performed of the face, neck, trunk, arms, legs; \par The patient is well, alert and oriented, pleasant and cooperative.\par Eyelids, conjunctivae, oral mucosa, digits and nails all normal.  \par No cervical adenopathy.\par \par Normal findings include:\par \par Seborrheic keratoses\par Angiomas\par + lentigines and solar damage are present in sun exposed areas; \par Back;  large pigmented patch, with demarcation at midline, extending to R abdomen\par \par No lesions were suspicious for malignancy. \par \par

## 2023-06-23 NOTE — ASSESSMENT
[FreeTextEntry1] : Complete skin examination is negative for malignancy; Multiple new concerns were addressed and discussed.\par Therapeutic options and their risks and benefits; along with multiple diagnostic possibilities were discussed at length;\par risks and benefits of skin biopsy and/or other further study were discussed;\par \par Continue regular exams;\par \par TV;  ketoconazole shampoo regimen; Rx sent\par \par also:  has mosaic pigmentation back/R abdomen;  no tx needed

## 2023-06-23 NOTE — HISTORY OF PRESENT ILLNESS
[de-identified] : Pt. presents for skin check;\par c/o few spots of concern;  \par Severity:  mild  \par Modifying factors:  none\par Associated symptoms:  none\par Context:  no association with activity\par c/o history of T. Versicolor, used topicals in past; \par \par \par

## 2024-06-26 ENCOUNTER — APPOINTMENT (OUTPATIENT)
Dept: DERMATOLOGY | Facility: CLINIC | Age: 74
End: 2024-06-26

## 2024-08-01 ENCOUNTER — APPOINTMENT (OUTPATIENT)
Dept: DERMATOLOGY | Facility: CLINIC | Age: 74
End: 2024-08-01
Payer: COMMERCIAL

## 2024-08-01 DIAGNOSIS — L82.1 OTHER SEBORRHEIC KERATOSIS: ICD-10-CM

## 2024-08-01 DIAGNOSIS — D22.9 MELANOCYTIC NEVI, UNSPECIFIED: ICD-10-CM

## 2024-08-01 DIAGNOSIS — L81.4 OTHER MELANIN HYPERPIGMENTATION: ICD-10-CM

## 2024-08-01 DIAGNOSIS — B36.0 PITYRIASIS VERSICOLOR: ICD-10-CM

## 2024-08-01 PROCEDURE — 99214 OFFICE O/P EST MOD 30 MIN: CPT

## 2024-08-01 PROCEDURE — 99204 OFFICE O/P NEW MOD 45 MIN: CPT

## 2024-08-01 NOTE — HISTORY OF PRESENT ILLNESS
[de-identified] : Pt. presents for skin check; c/o few spots of concern;   Severity:  mild   Modifying factors:  none Associated symptoms:  none Context:  no association with activity c/o history of T. Versicolor, used topicals in past;

## 2024-08-01 NOTE — PHYSICAL EXAM
[Full Body Skin Exam Performed] : performed [FreeTextEntry3] : Skin examination performed of the face, neck, trunk, arms, legs;  The patient is well, alert and oriented, pleasant and cooperative. Eyelids, conjunctivae, oral mucosa, digits and nails all normal.   No cervical adenopathy.  Normal findings include:  Seborrheic keratoses- larger lesion on crown of scalp + multiple fleshy fibromas on back Angiomas- largest R chest + lentigines and solar damage are present in sun exposed areas;  Back;  large pigmented patch, with demarcation at midline, extending to R abdomen  No lesions were suspicious for malignancy.

## 2024-08-13 ENCOUNTER — NON-APPOINTMENT (OUTPATIENT)
Age: 74
End: 2024-08-13

## 2024-08-14 ENCOUNTER — APPOINTMENT (OUTPATIENT)
Dept: DERMATOLOGY | Facility: CLINIC | Age: 74
End: 2024-08-14
Payer: MEDICARE

## 2024-08-14 DIAGNOSIS — L82.1 OTHER SEBORRHEIC KERATOSIS: ICD-10-CM

## 2024-08-14 PROCEDURE — 99213 OFFICE O/P EST LOW 20 MIN: CPT

## 2024-08-14 NOTE — PHYSICAL EXAM
[FreeTextEntry3] : Right upper arm Scaling waxy stuck on papule;  small, no suspicious features viewed photo which showed hemorrhagic appearance

## 2024-08-14 NOTE — ASSESSMENT
[FreeTextEntry1] : inflamed SK/ LPLK changed color acutely, now resolved   Therapeutic options and their risks and benefits; along with multiple diagnostic possibilities were discussed at length; risks and benefits of further study were discussed;  No treatment is required unless this lesion becomes symptomatic. Continue regular exams;

## 2024-08-14 NOTE — HISTORY OF PRESENT ILLNESS
[de-identified] : The patient has been fit in for urgent appointment.  c/o lesion on right arm, changed shortly after last visit, now resolved

## 2025-07-31 ENCOUNTER — NON-APPOINTMENT (OUTPATIENT)
Age: 75
End: 2025-07-31

## 2025-07-31 ENCOUNTER — APPOINTMENT (OUTPATIENT)
Dept: DERMATOLOGY | Facility: CLINIC | Age: 75
End: 2025-07-31
Payer: MEDICARE

## 2025-07-31 DIAGNOSIS — L81.4 OTHER MELANIN HYPERPIGMENTATION: ICD-10-CM

## 2025-07-31 DIAGNOSIS — B36.0 PITYRIASIS VERSICOLOR: ICD-10-CM

## 2025-07-31 DIAGNOSIS — R58 HEMORRHAGE, NOT ELSEWHERE CLASSIFIED: ICD-10-CM

## 2025-07-31 DIAGNOSIS — D22.9 MELANOCYTIC NEVI, UNSPECIFIED: ICD-10-CM

## 2025-07-31 PROCEDURE — 99214 OFFICE O/P EST MOD 30 MIN: CPT

## 2025-09-16 ENCOUNTER — APPOINTMENT (OUTPATIENT)
Dept: NEUROLOGY | Facility: CLINIC | Age: 75
End: 2025-09-16
Payer: MEDICARE

## 2025-09-16 VITALS
DIASTOLIC BLOOD PRESSURE: 73 MMHG | HEIGHT: 70 IN | WEIGHT: 175 LBS | SYSTOLIC BLOOD PRESSURE: 132 MMHG | BODY MASS INDEX: 25.05 KG/M2 | HEART RATE: 70 BPM | RESPIRATION RATE: 16 BRPM

## 2025-09-16 DIAGNOSIS — E53.9 VITAMIN B DEFICIENCY, UNSPECIFIED: ICD-10-CM

## 2025-09-16 DIAGNOSIS — R20.2 PARESTHESIA OF SKIN: ICD-10-CM

## 2025-09-16 PROCEDURE — 99204 OFFICE O/P NEW MOD 45 MIN: CPT

## 2025-09-17 ENCOUNTER — NON-APPOINTMENT (OUTPATIENT)
Age: 75
End: 2025-09-17